# Patient Record
Sex: FEMALE | Race: WHITE | Employment: STUDENT | URBAN - NONMETROPOLITAN AREA
[De-identification: names, ages, dates, MRNs, and addresses within clinical notes are randomized per-mention and may not be internally consistent; named-entity substitution may affect disease eponyms.]

---
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
## 2024-10-25 ENCOUNTER — OFFICE VISIT (OUTPATIENT)
Dept: FAMILY MEDICINE CLINIC | Age: 24
End: 2024-10-25
Payer: COMMERCIAL

## 2024-10-25 VITALS
DIASTOLIC BLOOD PRESSURE: 68 MMHG | TEMPERATURE: 97.3 F | OXYGEN SATURATION: 98 % | HEIGHT: 72 IN | SYSTOLIC BLOOD PRESSURE: 120 MMHG | WEIGHT: 177 LBS | BODY MASS INDEX: 23.98 KG/M2 | HEART RATE: 85 BPM

## 2024-10-25 DIAGNOSIS — H69.93 ETD (EUSTACHIAN TUBE DYSFUNCTION), BILATERAL: ICD-10-CM

## 2024-10-25 DIAGNOSIS — R42 DIZZY: ICD-10-CM

## 2024-10-25 DIAGNOSIS — J01.40 ACUTE NON-RECURRENT PANSINUSITIS: Primary | ICD-10-CM

## 2024-10-25 PROCEDURE — 99213 OFFICE O/P EST LOW 20 MIN: CPT | Performed by: EMERGENCY MEDICINE

## 2024-10-25 RX ORDER — DESVENLAFAXINE 50 MG/1
50 TABLET, FILM COATED, EXTENDED RELEASE ORAL DAILY
COMMUNITY

## 2024-10-25 RX ORDER — CETIRIZINE HYDROCHLORIDE, PSEUDOEPHEDRINE HYDROCHLORIDE 5; 120 MG/1; MG/1
1 TABLET, FILM COATED, EXTENDED RELEASE ORAL 2 TIMES DAILY
Qty: 30 TABLET | Refills: 0 | Status: SHIPPED | OUTPATIENT
Start: 2024-10-25

## 2024-10-25 RX ORDER — BUSPIRONE HYDROCHLORIDE 15 MG/1
15 TABLET ORAL 2 TIMES DAILY
COMMUNITY

## 2024-10-25 RX ORDER — FAMOTIDINE 20 MG/1
20 TABLET, FILM COATED ORAL 2 TIMES DAILY
COMMUNITY

## 2024-10-25 RX ORDER — METHYLPREDNISOLONE 4 MG/1
TABLET ORAL
Qty: 1 KIT | Refills: 0 | Status: SHIPPED | OUTPATIENT
Start: 2024-10-25

## 2024-10-25 ASSESSMENT — ENCOUNTER SYMPTOMS
WHEEZING: 0
SHORTNESS OF BREATH: 0
VOMITING: 0
SINUS PRESSURE: 1
RHINORRHEA: 1
COUGH: 0
EYE DISCHARGE: 0
SORE THROAT: 0
ABDOMINAL DISTENTION: 0
NAUSEA: 0
EYE PAIN: 0
EYE REDNESS: 0
BACK PAIN: 0
DIARRHEA: 0

## 2024-10-25 NOTE — PROGRESS NOTES
Chief Complaint:   Dizziness (Started yesterday with dizziness, head pressure and ear pain/) and Ear Pain      History of Present Illness   HPI:  Rachell Amaro is a 24 y.o. female who [facility] for [facility]zy, ear pressure, sinus pressure    Prior to Visit Medications    Medication Sig Taking? Authorizing Provider   desvenlafaxine succinate (PRISTIQ) 50 MG TB24 extended release tablet Take 1 tablet by mouth daily Yes Clarissa Suarez MD   famotidine (PEPCID) 20 MG tablet Take 1 tablet by mouth 2 times daily Yes ProviderClarissa MD   busPIRone (BUSPAR) 15 MG tablet Take 15 mg by mouth in the morning and at bedtime Currently taking 7.5mg TID Yes Provider, MD Clarissa   methylPREDNISolone (MEDROL, MIKE,) 4 MG tablet Follow package instructions Yes Gerardo Dotson, DO   cetirizine-psuedoephedrine (ZYRTEC-D) 5-120 MG per extended release tablet Take 1 tablet by mouth 2 times daily Yes Gerardo Dotson DO   amoxicillin-clavulanate (AUGMENTIN) 875-125 MG per tablet Take 1 tablet by mouth 2 times daily for 10 days Yes Gerardo Dotson, DO   Levonorgestrel (KYLEENA IU) by Intrauterine route  Provider, MD Clarissa       Review of Systems   Review of Systems   Constitutional:  Positive for activity change. Negative for chills and fever.   HENT:  Positive for congestion, ear pain, postnasal drip, rhinorrhea and sinus pressure. Negative for sore throat.    Eyes:  Negative for pain, discharge and redness.   Respiratory:  Negative for cough, shortness of breath and wheezing.    Cardiovascular:  Negative for chest pain.   Gastrointestinal:  Negative for abdominal distention, diarrhea, nausea and vomiting.   Genitourinary:  Negative for dysuria and frequency.   Musculoskeletal:  Negative for arthralgias and back pain.   Skin:  Negative for rash and wound.   Neurological:  Positive for dizziness and headaches. Negative for weakness.   Hematological:  Negative for adenopathy.   Psychiatric/Behavioral: